# Patient Record
Sex: FEMALE | Employment: UNEMPLOYED | URBAN - NONMETROPOLITAN AREA
[De-identification: names, ages, dates, MRNs, and addresses within clinical notes are randomized per-mention and may not be internally consistent; named-entity substitution may affect disease eponyms.]

---

## 2023-12-28 ENCOUNTER — NURSE ONLY (OUTPATIENT)
Dept: LAB | Age: 33
End: 2023-12-28

## 2023-12-30 LAB
SOURCE: NORMAL
TRICHOMONAS VAGINALI, MOLECULAR: NEGATIVE

## 2024-01-03 LAB
C. TRACHOMATIS DNA,THIN PREP: NEGATIVE
N. GONORRHOEAE DNA, THIN PREP: NEGATIVE
SOURCE: NORMAL

## 2024-01-10 LAB — CYTOLOGY THIN PREP PAP: NORMAL

## 2024-04-10 ENCOUNTER — HOSPITAL ENCOUNTER (OUTPATIENT)
Dept: PHYSICAL THERAPY | Age: 34
Setting detail: THERAPIES SERIES
Discharge: HOME | End: 2024-04-10
Payer: COMMERCIAL

## 2024-04-10 PROCEDURE — 97530 THERAPEUTIC ACTIVITIES: CPT

## 2024-04-10 PROCEDURE — 97162 PT EVAL MOD COMPLEX 30 MIN: CPT

## 2024-04-10 PROCEDURE — 97110 THERAPEUTIC EXERCISES: CPT

## 2024-04-10 NOTE — PROGRESS NOTES
** PLEASE SIGN, DATE AND TIME CERTIFICATION BELOW AND RETURN TO [facility] OUTPATIENT REHABILITATION (FAX #: [phone]).  ATTEST/CO-SIGN IF ACCESSING VIA [employer].  THANK YOU.**    I certify that I have examined the patient below and determined that Physical Medicine and Rehabilitation service is necessary and that I approve the established plan of care for up to 90 days or as specifically noted.  Attestation, signature or co-signature of physician indicates approval of certification requirements.    ________________________ ____________ __________  Physician Signature   Date   Time  [facility]  PHYSICAL THERAPY  OUTPATIENT REHABILITATION - SPECIALIZED THERAPY SERVICES  [x] PELVIC HEALTH EVALUATION  [] DAILY NOTE  [] PROGRESS NOTE [] DISCHARGE NOTE    Date: 4/10/2024  Patient Name:  Elida Rosenthal  : 1990  MRN: [id number]  CSN: [id number]    Referring Practitioner Thao Anand MD   Diagnosis Pelvic and perineal pain   Treatment Diagnosis M62.81 - Muscle Weakness (Generalized) and R10.2 - Pelvic and Perineal Pain  R10.30 - Lower Abdominal Pain, Unspecified  M99.05 - Segmental and Somatic Dysfunction of Pelvic Region   Date of Evaluation 4/10/24    Additional Pertinent History Anxiety, issues with blood clots at last delivery      Functional Outcome Measure Used Modified RUSSEL   Functional Outcome Score 20/50 (4/10/24)       Insurance: Primary: Payor: BCBS /  /  / ,   Secondary:    Authorization Information: No precert required   Visit # 1, 1/10 for progress note (Reporting Period: 4/10/24 to     )   Visits Allowed: 60 visits per calendar year.  UNKNOWN visits have been used.. Hard Max.. PT/OT/ST COMBINED    Recertification Date: 6/10/24   Physician Follow-Up:    Physician Orders:    History of Present Illness: Pt is a 33 year old female who is 27 weeks pregnant and presents with complaints of L SI pain, occasionally R SI pain, and lower abdominal tightness. States that the pain

## 2024-04-18 ENCOUNTER — HOSPITAL ENCOUNTER (OUTPATIENT)
Dept: PHYSICAL THERAPY | Age: 34
Setting detail: THERAPIES SERIES
Discharge: HOME | End: 2024-04-18
Payer: COMMERCIAL

## 2024-04-18 PROCEDURE — 97110 THERAPEUTIC EXERCISES: CPT

## 2024-04-18 PROCEDURE — 97530 THERAPEUTIC ACTIVITIES: CPT

## 2024-04-18 NOTE — PROGRESS NOTES
[facility]  PHYSICAL THERAPY  OUTPATIENT REHABILITATION - SPECIALIZED THERAPY SERVICES  [] PELVIC HEALTH EVALUATION  [x] DAILY NOTE  [] PROGRESS NOTE [] DISCHARGE NOTE    Date: 2024  Patient Name:  Elida oRsenthal  : 1990  MRN: [id number]  CSN: [id number]    Referring Practitioner Thao Anand MD   Diagnosis Pelvic and perineal pain   Treatment Diagnosis M62.81 - Muscle Weakness (Generalized) and R10.2 - Pelvic and Perineal Pain  R10.30 - Lower Abdominal Pain, Unspecified  M99.05 - Segmental and Somatic Dysfunction of Pelvic Region   Date of Evaluation 4/10/24    Additional Pertinent History Anxiety, issues with blood clots at last delivery      Functional Outcome Measure Used Modified RUSSEL   Functional Outcome Score 20/50 (4/10/24)       Insurance: Primary: Payor: BCBS /  /  / ,   Secondary:    Authorization Information: No precert required   Visit # 2, 2/10 for progress note (Reporting Period: 4/10/24 to     )   Visits Allowed: 60 visits per calendar year.  UNKNOWN visits have been used.. Hard Max.. PT/OT/ST COMBINED    Recertification Date: 6/10/24   Physician Follow-Up:    Physician Orders:    History of Present Illness: Pt is a 33 year old female who is 27 weeks pregnant and presents with complaints of L SI pain, occasionally R SI pain, and lower abdominal tightness. States that the pain started once she hit the second trimester. Has been seeing chiropractor that seems like it has been helpful. With prolonged standing she has a dull ache in the pelvic area and it feels like the baby is going to fall out. Would like to strengthen prior to .     SUBJECTIVE: Pt reports that she feels really loose on the L side if she steps the wrong way janelle in the evenings after being active throughout day. Also reports that she feels like her belly gets in the way with attempts at swiss ball rollouts. States that she is making 2 trips to [place] this weekend and wonders if there are

## 2024-04-24 ENCOUNTER — APPOINTMENT (OUTPATIENT)
Dept: PHYSICAL THERAPY | Age: 34
End: 2024-04-24
Payer: COMMERCIAL

## 2024-10-14 ENCOUNTER — OFFICE VISIT (OUTPATIENT)
Dept: FAMILY MEDICINE CLINIC | Age: 34
End: 2024-10-14

## 2024-10-14 VITALS
HEIGHT: 62 IN | WEIGHT: 165.2 LBS | DIASTOLIC BLOOD PRESSURE: 80 MMHG | TEMPERATURE: 98.1 F | BODY MASS INDEX: 30.4 KG/M2 | SYSTOLIC BLOOD PRESSURE: 108 MMHG | HEART RATE: 80 BPM | OXYGEN SATURATION: 98 % | RESPIRATION RATE: 16 BRPM

## 2024-10-14 DIAGNOSIS — R89.9 ABNORMAL LABORATORY TEST: ICD-10-CM

## 2024-10-14 DIAGNOSIS — R16.0 HEPATOMEGALY: ICD-10-CM

## 2024-10-14 DIAGNOSIS — M54.9 BACK PAIN, UNSPECIFIED BACK LOCATION, UNSPECIFIED BACK PAIN LATERALITY, UNSPECIFIED CHRONICITY: ICD-10-CM

## 2024-10-14 DIAGNOSIS — Z23 NEED FOR INFLUENZA VACCINATION: ICD-10-CM

## 2024-10-14 DIAGNOSIS — F32.A DEPRESSION, UNSPECIFIED DEPRESSION TYPE: ICD-10-CM

## 2024-10-14 DIAGNOSIS — F41.9 ANXIETY: ICD-10-CM

## 2024-10-14 DIAGNOSIS — Z00.00 ENCOUNTER FOR MEDICAL EXAMINATION TO ESTABLISH CARE: Primary | ICD-10-CM

## 2024-10-14 LAB
BILIRUBIN, POC: NEGATIVE
BLOOD URINE, POC: NEGATIVE
CLARITY, POC: CLEAR
COLOR, POC: YELLOW
GLUCOSE URINE, POC: NEGATIVE MG/DL
KETONES, POC: NEGATIVE MG/DL
LEUKOCYTE EST, POC: NEGATIVE
NITRITE, POC: NEGATIVE
PH, POC: 6
PROTEIN, POC: NEGATIVE MG/DL
SPECIFIC GRAVITY, POC: 1.02
UROBILINOGEN, POC: 0.2 MG/DL

## 2024-10-14 SDOH — ECONOMIC STABILITY: FOOD INSECURITY: WITHIN THE PAST 12 MONTHS, THE FOOD YOU BOUGHT JUST DIDN'T LAST AND YOU DIDN'T HAVE MONEY TO GET MORE.: NEVER TRUE

## 2024-10-14 SDOH — ECONOMIC STABILITY: FOOD INSECURITY: WITHIN THE PAST 12 MONTHS, YOU WORRIED THAT YOUR FOOD WOULD RUN OUT BEFORE YOU GOT MONEY TO BUY MORE.: NEVER TRUE

## 2024-10-14 SDOH — HEALTH STABILITY: PHYSICAL HEALTH: ON AVERAGE, HOW MANY MINUTES DO YOU ENGAGE IN EXERCISE AT THIS LEVEL?: 40 MIN

## 2024-10-14 SDOH — ECONOMIC STABILITY: INCOME INSECURITY: HOW HARD IS IT FOR YOU TO PAY FOR THE VERY BASICS LIKE FOOD, HOUSING, MEDICAL CARE, AND HEATING?: NOT HARD AT ALL

## 2024-10-14 SDOH — HEALTH STABILITY: PHYSICAL HEALTH: ON AVERAGE, HOW MANY DAYS PER WEEK DO YOU ENGAGE IN MODERATE TO STRENUOUS EXERCISE (LIKE A BRISK WALK)?: 3 DAYS

## 2024-10-14 ASSESSMENT — PATIENT HEALTH QUESTIONNAIRE - PHQ9
5. POOR APPETITE OR OVEREATING: SEVERAL DAYS
2. FEELING DOWN, DEPRESSED OR HOPELESS: SEVERAL DAYS
SUM OF ALL RESPONSES TO PHQ QUESTIONS 1-9: 3
6. FEELING BAD ABOUT YOURSELF - OR THAT YOU ARE A FAILURE OR HAVE LET YOURSELF OR YOUR FAMILY DOWN: NOT AT ALL
1. LITTLE INTEREST OR PLEASURE IN DOING THINGS: NOT AT ALL
SUM OF ALL RESPONSES TO PHQ9 QUESTIONS 1 & 2: 1
SUM OF ALL RESPONSES TO PHQ QUESTIONS 1-9: 3
SUM OF ALL RESPONSES TO PHQ QUESTIONS 1-9: 3
4. FEELING TIRED OR HAVING LITTLE ENERGY: SEVERAL DAYS
1. LITTLE INTEREST OR PLEASURE IN DOING THINGS: NOT AT ALL
7. TROUBLE CONCENTRATING ON THINGS, SUCH AS READING THE NEWSPAPER OR WATCHING TELEVISION: NOT AT ALL
SUM OF ALL RESPONSES TO PHQ QUESTIONS 1-9: 1
9. THOUGHTS THAT YOU WOULD BE BETTER OFF DEAD, OR OF HURTING YOURSELF: NOT AT ALL
SUM OF ALL RESPONSES TO PHQ QUESTIONS 1-9: 1
10. IF YOU CHECKED OFF ANY PROBLEMS, HOW DIFFICULT HAVE THESE PROBLEMS MADE IT FOR YOU TO DO YOUR WORK, TAKE CARE OF THINGS AT HOME, OR GET ALONG WITH OTHER PEOPLE: NOT DIFFICULT AT ALL
SUM OF ALL RESPONSES TO PHQ QUESTIONS 1-9: 1
2. FEELING DOWN, DEPRESSED OR HOPELESS: SEVERAL DAYS
SUM OF ALL RESPONSES TO PHQ QUESTIONS 1-9: 1
8. MOVING OR SPEAKING SO SLOWLY THAT OTHER PEOPLE COULD HAVE NOTICED. OR THE OPPOSITE, BEING SO FIGETY OR RESTLESS THAT YOU HAVE BEEN MOVING AROUND A LOT MORE THAN USUAL: NOT AT ALL
3. TROUBLE FALLING OR STAYING ASLEEP: NOT AT ALL
SUM OF ALL RESPONSES TO PHQ QUESTIONS 1-9: 3
SUM OF ALL RESPONSES TO PHQ9 QUESTIONS 1 & 2: 1

## 2024-10-14 ASSESSMENT — ENCOUNTER SYMPTOMS
VOMITING: 0
SINUS PRESSURE: 0
SHORTNESS OF BREATH: 0
CONSTIPATION: 0
COLOR CHANGE: 0
VOICE CHANGE: 0
CHOKING: 0
NAUSEA: 0
CHEST TIGHTNESS: 0
EYE PAIN: 0
WHEEZING: 0
EYE DISCHARGE: 0
ABDOMINAL PAIN: 0
ABDOMINAL DISTENTION: 0
EYE ITCHING: 0
COUGH: 0

## 2024-10-14 NOTE — PROGRESS NOTES
After obtaining consent, and per orders of Nathaly CORONADO, injection of Influenza given in Left deltoid by Lul Oleary LPN. Patient instructed to remain in clinic for 20 minutes afterwards, and to report any adverse reaction to me immediately.    Immunizations Administered       Name Date Dose Route    Influenza, FLUCELVAX, (age 6 mo+) IM, Trivalent PF, 0.5mL 10/14/2024 0.5 mL Intramuscular    Site: Deltoid- Left    Lot: [id number]    NDC: 74254-330-16        Pt tolerated injection well. Checklist filled out. VIS given

## 2024-10-14 NOTE — PROGRESS NOTES
Health Maintenance Due   Topic Date Due    Varicella vaccine (1 of 2 - 13+ 2-dose series) Never done    HIV screen  Never done    Hepatitis C screen  Never done    Flu vaccine (1) 08/01/2024     Wanting flu shot today

## 2024-10-14 NOTE — PROGRESS NOTES
[facility]  [address] [address]  Dept: [phone]  Loc: [phone]    Elida Rosenthal (:  1990) is a 34 y.o. female,Established patient, here for evaluation of the following chief complaint(s):  New Patient (Wanting to est care and post appendectomy 10/6/24 in [place]. Concern of fatty liver/ enlarged on CT. Over due for lupus anticoagulant testing, positive around 2 years ago and they wanted it tested again around 6 months. Having lower back pain since surgery)      ASSESSMENT/PLAN:  1. Encounter for medical examination to establish care  New patient establishing care.   She was in [place] on vacation and went to the ER on 10/6, ended up having a laparoscopic appendectomy on 10/6.     Incidental finding : Concern of fatty liver/ enlarged on CT.   Reports she is Over due for lupus anticoagulant testing, popped positive around 2 years ago and they wanted it tested again around 6 months.   Having lower back pain since surgery  Father with alcohol related cirrhosis.   2. Hepatomegaly  Incidental finding : Concern of fatty liver/ enlarged on CT.   Reports she is Over due for lupus anticoagulant testing, popped positive around 2 years ago and they wanted it tested again around 6 months.   Having lower back pain since surgery  Father with alcohol related cirrhosis.   Referral to GI placed.     -     TONY - Elisabeth Lehman MD, Gastroenterology, [place]  3. Abnormal laboratory test  -     Lupus Anticoagulant Reflex Panel; Future  4. Anxiety  5. Depression, unspecified depression type  Controlled with sertraline 50 mg. Patient denies any SI/HI  6. Back pain, unspecified back location, unspecified back pain laterality, unspecified chronicity  UA unremarkable.   No abnormality noted on exam.   Abdomen soft, non tender.   Likely d/t surgery and travel.  -     POCT Urinalysis No Micro (Auto)  7. Need for influenza vaccination  -     Influenza,

## 2024-10-16 LAB
CONFIRM DRVVT STA-STACLOT: 9.1 S
DRVVT SCREEN TO CONFIRM RATIO: ABNORMAL {RATIO}
DRVVT/DRVVT CFM P DOAC NEUT NORM PPP-RTO: ABNORMAL {RATIO}
HEPARIN NT PPP QL: ABNORMAL
LA 3 SCREEN W REFLEX-IMP: ABNORMAL
LMW HEPARIN IND PLT AB SER QL: ABNORMAL
MIXING DRVVT/NORMAL: ABNORMAL %
PROTHROMBIN TIME: 12.7 S (ref 12–15.5)
SCREEN APTT/NORMAL: 1.25
SCREEN APTT/NORMAL: ABNORMAL
SCREEN DRVVT/NORMAL: 1.17 %
THROMBIN TIME: 15.7 S

## 2024-10-18 ASSESSMENT — ENCOUNTER SYMPTOMS: BACK PAIN: 1

## 2024-10-24 ENCOUNTER — HOSPITAL ENCOUNTER (OUTPATIENT)
Age: 34
Discharge: HOME | End: 2024-10-24
Payer: COMMERCIAL

## 2024-10-24 DIAGNOSIS — R89.9 ABNORMAL LABORATORY TEST: ICD-10-CM

## 2024-10-24 LAB — RHEUMATOID FACT SERPL-ACNC: 12 IU/ML (ref 0–13)

## 2024-10-24 PROCEDURE — 86038 ANTINUCLEAR ANTIBODIES: CPT

## 2024-10-24 PROCEDURE — 86430 RHEUMATOID FACTOR TEST QUAL: CPT

## 2024-10-24 PROCEDURE — 36415 COLL VENOUS BLD VENIPUNCTURE: CPT

## 2024-10-26 LAB — NUCLEAR IGG SER QL IA: NORMAL

## 2024-10-28 ENCOUNTER — TELEPHONE (OUTPATIENT)
Dept: FAMILY MEDICINE CLINIC | Age: 34
End: 2024-10-28

## 2024-10-28 NOTE — TELEPHONE ENCOUNTER
----- Message from JIM Christensen CNP sent at 10/28/2024  4:54 PM EDT -----  KELSI unremarkable along as RH Factor, but with her abnormal Lupus Anticoagulant panel, I still feel a referral to Rheumatology is appropriate.

## 2024-11-01 LAB
BASOPHILS ABSOLUTE: 0.1 /ΜL
BASOPHILS RELATIVE PERCENT: 1.2 %
EOSINOPHILS ABSOLUTE: 0.2 /ΜL
EOSINOPHILS RELATIVE PERCENT: 2.7 %
HCT VFR BLD CALC: 40.2 % (ref 36–46)
HEMOGLOBIN: 14.2 G/DL (ref 12–16)
LYMPHOCYTES ABSOLUTE: 1.9 /ΜL
LYMPHOCYTES RELATIVE PERCENT: 30.1 %
MCH RBC QN AUTO: 30.8 PG
MCHC RBC AUTO-ENTMCNC: 35.2 G/DL
MCV RBC AUTO: 87.4 FL
MONOCYTES ABSOLUTE: 0.4 /ΜL
MONOCYTES RELATIVE PERCENT: 6.7 %
NEUTROPHILS ABSOLUTE: 3.8 /ΜL
NEUTROPHILS RELATIVE PERCENT: 59.3 %
PLATELET # BLD: 324 K/ΜL
PMV BLD AUTO: 9.2 FL
RBC # BLD: 4.6 10^6/ΜL
WBC # BLD: 6.3 10^3/ML

## 2025-01-14 ENCOUNTER — PATIENT MESSAGE (OUTPATIENT)
Dept: FAMILY MEDICINE CLINIC | Age: 35
End: 2025-01-14

## 2025-01-14 NOTE — TELEPHONE ENCOUNTER
We do not typically give this for the stomach virus.   I would encouraged ginger ale or other carbonated beverages that can help. If she is vomiting and unable to keep fluids down, then it may be appropriate, but yes, she would need to be seen for that.

## 2025-01-29 ENCOUNTER — TRANSCRIBE ORDERS (OUTPATIENT)
Dept: ADMINISTRATIVE | Age: 35
End: 2025-01-29

## 2025-01-29 DIAGNOSIS — R16.0 HEPATOMEGALY: Primary | ICD-10-CM

## 2025-02-04 ENCOUNTER — HOSPITAL ENCOUNTER (OUTPATIENT)
Dept: ULTRASOUND IMAGING | Age: 35
Discharge: HOME | End: 2025-02-04
Payer: COMMERCIAL

## 2025-02-04 DIAGNOSIS — R16.0 HEPATOMEGALY: ICD-10-CM

## 2025-02-04 PROCEDURE — 93975 VASCULAR STUDY: CPT

## 2025-02-04 PROCEDURE — 76705 ECHO EXAM OF ABDOMEN: CPT

## 2025-04-17 NOTE — PROGRESS NOTES
[facility] RHEUMATOLOGY CONSULT   Date Of Service: 4/24/2025  Provider: JOVANNI HOWELL DO, DO  Name: Elida Rosenthal   MRN: [id number]          Subjective     CC: New Patient (Abnormal labs)       Elida Rosenthal   is a(n)34 y.o. female with a hx of  has a past medical history of Anxiety, Depression, Hypertension, and Irritable bowel syndrome.  , MTHFR , h/o low back pain  referred by Nathaly Cole, APR* for evaluation of h/o lupus anticoagulant +  (+ testing 12/16/2022, 10/14/2024)     Pt found to have + testing 12/16/2022, 10/14/2024. Initially evaluated after 3rd pregnancy - clotting in the placenta and umbilical cord, - pre-eclampsia w/ first pregnancy. No miscarriages , DVT, PE, arterial thrombosis.     Reports hx of IBS - D diagnosed in 20's - really improved. Report hives, rashes, myalgia, arthralgia intermittently occurring in her last 20's. - increased pain when having a regular menstrual cycles and 2 weeks after the cycles. She is currently having pain in the lower back and left knee - constant nagging pain in the left knee and lower back. Most severe at the end of the day. Pain up to 5/10. Aggravating - , running, activities.  Alleviating: hot tub, prn ibuprofen.     + am stiffness lasting for the first hour, relief with movement. Waking b/c children and occasionally the pain. + gelling (car rides) , denies joint swelling.   + dry mouth - intermittent   + occasional numbness in the legs - typically with activity - relief w/ movement.     1 child w/ eczema,            -denies Photosenstivity, Rash,, oral/nasal sores, Raynaud's, digital ulcerations, skin tightening, renal disease,foamy urination, hematuria, sz's, blood clots, serositis, Enthesitis, dactylitis, nail changes, hx of STD,  personal or family history of Psoriatic arthritis, psoriasis, ank spond,       Cancer screening: up to date.       Review of Systems    Review of Systems   Constitutional:  Positive for fatigue.   HENT: Negative.

## 2025-04-24 ENCOUNTER — OFFICE VISIT (OUTPATIENT)
Age: 35
End: 2025-04-24
Payer: COMMERCIAL

## 2025-04-24 ENCOUNTER — LAB (OUTPATIENT)
Dept: LAB | Age: 35
End: 2025-04-24

## 2025-04-24 ENCOUNTER — RESULTS FOLLOW-UP (OUTPATIENT)
Age: 35
End: 2025-04-24

## 2025-04-24 VITALS
DIASTOLIC BLOOD PRESSURE: 72 MMHG | HEART RATE: 75 BPM | OXYGEN SATURATION: 99 % | SYSTOLIC BLOOD PRESSURE: 106 MMHG | HEIGHT: 62 IN | WEIGHT: 149 LBS | BODY MASS INDEX: 27.42 KG/M2

## 2025-04-24 DIAGNOSIS — M79.10 MYALGIA: Primary | ICD-10-CM

## 2025-04-24 DIAGNOSIS — M25.50 POLYARTHRALGIA: ICD-10-CM

## 2025-04-24 DIAGNOSIS — R76.0 LUPUS ANTICOAGULANT POSITIVE: ICD-10-CM

## 2025-04-24 DIAGNOSIS — M79.10 MYALGIA: ICD-10-CM

## 2025-04-24 DIAGNOSIS — R53.83 OTHER FATIGUE: ICD-10-CM

## 2025-04-24 LAB
ALBUMIN SERPL BCG-MCNC: 4.4 G/DL (ref 3.4–4.9)
ALP SERPL-CCNC: 97 U/L (ref 38–126)
ALT SERPL W/O P-5'-P-CCNC: 13 U/L (ref 10–35)
ANION GAP SERPL CALC-SCNC: 12 MEQ/L (ref 8–16)
AST SERPL-CCNC: 23 U/L (ref 10–35)
BASOPHILS ABSOLUTE: 0.1 THOU/MM3 (ref 0–0.1)
BASOPHILS NFR BLD AUTO: 1.2 %
BILIRUB SERPL-MCNC: 0.3 MG/DL (ref 0.3–1.2)
BUN SERPL-MCNC: 14 MG/DL (ref 8–23)
CALCIUM SERPL-MCNC: 9.3 MG/DL (ref 8.6–10)
CHLORIDE SERPL-SCNC: 106 MEQ/L (ref 98–111)
CO2 SERPL-SCNC: 25 MEQ/L (ref 22–29)
CREAT SERPL-MCNC: 0.7 MG/DL (ref 0.5–0.9)
CRP SERPL-MCNC: 0.34 MG/DL (ref 0–0.5)
DEPRECATED RDW RBC AUTO: 43.4 FL (ref 35–45)
EOSINOPHIL NFR BLD AUTO: 2.5 %
EOSINOPHILS ABSOLUTE: 0.1 THOU/MM3 (ref 0–0.4)
ERYTHROCYTE [DISTWIDTH] IN BLOOD BY AUTOMATED COUNT: 12.9 % (ref 11.5–14.5)
ERYTHROCYTE [SEDIMENTATION RATE] IN BLOOD BY WESTERGREN METHOD: 16 MM/HR (ref 0–20)
GFR SERPL CREATININE-BSD FRML MDRD: > 90 ML/MIN/1.73M2
GLUCOSE SERPL-MCNC: 91 MG/DL (ref 74–109)
HCT VFR BLD AUTO: 43.5 % (ref 37–47)
HGB BLD-MCNC: 14.5 GM/DL (ref 12–16)
IMM GRANULOCYTES # BLD AUTO: 0.01 THOU/MM3 (ref 0–0.07)
IMM GRANULOCYTES NFR BLD AUTO: 0.2 %
LYMPHOCYTES ABSOLUTE: 1.8 THOU/MM3 (ref 1–4.8)
LYMPHOCYTES NFR BLD AUTO: 31.9 %
MCH RBC QN AUTO: 30.1 PG (ref 26–33)
MCHC RBC AUTO-ENTMCNC: 33.3 GM/DL (ref 32.2–35.5)
MCV RBC AUTO: 90.4 FL (ref 81–99)
MONOCYTES ABSOLUTE: 0.3 THOU/MM3 (ref 0.4–1.3)
MONOCYTES NFR BLD AUTO: 6 %
NEUTROPHILS ABSOLUTE: 3.3 THOU/MM3 (ref 1.8–7.7)
NEUTROPHILS NFR BLD AUTO: 58.2 %
NRBC BLD AUTO-RTO: 0 /100 WBC
PLATELET # BLD AUTO: 329 THOU/MM3 (ref 130–400)
PMV BLD AUTO: 11.2 FL (ref 9.4–12.4)
POTASSIUM SERPL-SCNC: 4.2 MEQ/L (ref 3.5–5.2)
PROT SERPL-MCNC: 7.1 G/DL (ref 6.4–8.3)
RBC # BLD AUTO: 4.81 MILL/MM3 (ref 4.2–5.4)
SODIUM SERPL-SCNC: 143 MEQ/L (ref 135–145)
TSH SERPL DL<=0.05 MIU/L-ACNC: 1.27 UIU/ML (ref 0.27–4.2)
WBC # BLD AUTO: 5.6 THOU/MM3 (ref 4.8–10.8)

## 2025-04-24 PROCEDURE — 99204 OFFICE O/P NEW MOD 45 MIN: CPT | Performed by: INTERNAL MEDICINE

## 2025-04-24 ASSESSMENT — RHEUMATOLOGY NEW PATIENT QUESTIONNAIRE
NODULES/BUMPS: N
EXCESSIVE HAIR LOSS (MORE THAN YOUR NORM): N
VAGINAL DRYNESS: N
SWOLLEN OR TENDER GLANDS: N
DEPRESSION: N
COLOR CHANGES OF HANDS OR FEET IN THE COLD: Y
SWOLLEN LEGS OR FEET: N
EYE PAIN: N
UNUSUAL BLEEDING: N
SORES IN MOUTH OR NOSE: N
STOMACH PAIN: N
JOINT PAIN: Y
HOARSE VOICE: N
UNEXPLAINED HEARING LOSS: N
HEARTBURN OR REFLUX: N
SKIN TIGHTNESS: N
PAIN OR BURNING ON URINATION: N
PERSISTENT DIARRHEA: N
DOUBLE OR BLURRED VISION: N
EASILY LOSING TEMPER: N
DIFFICULTY SWALLOWING: N
FAINTING: N
BEHAVIORAL CHANGES: N
CHEST PAIN: N
INCREASED SUSCEPTIBILITY TO INFECTION: N
HEADACHES: Y
DIFFICULTY STAYING ASLEEP: N
EASY BRUISING: N
UNUSUALLY RAPID OR SLOWED HEART RATE: N
JOINT SWELLING: N
MORNING STIFFNESS: Y
LOSS OF VISION: N
NAUSEA: N
LOSS OF CONSCIOUSNESS: N
COUGH: N
AGITATION: N
NUMBNESS OR TINGLING IN HANDS OR FEET: Y
SUN SENSITIVE (SUN ALLERGY): N
ANXIETY: N
RASH: Y
RASH OR ULCERS: N
NIGHT SWEATS: N
BLOOD IN STOOLS: N
EYE REDNESS: N
SHORTNESS OF BREATH: N
BLACK STOOLS: N
UNEXPLAINED WEIGHT CHANGE: N
HOW WOULD YOU DESCRIBE YOUR STIFFNESS ON AVERAGE: MILD
MORNING STIFFNESS IN LOWER BACK: Y
SEIZURES: N
DIFFICULTY FALLING ASLEEP: N
ABNORMAL URINE: N
UNUSUAL FATIGUE: N
MUSCLE WEAKNESS: N
EYE DRYNESS: N
SKIN REDNESS: Y
ANEMIA: N
DRYNESS OF MOUTH: N
FEVER: N
MEMORY LOSS: N
JAUNDICE: N
VOMITING OF BLOOD OR COFFEE GROUND CONSISTENCY MATERIAL: N
DIFFICULTY BREATHING LYING DOWN: N

## 2025-04-24 ASSESSMENT — ENCOUNTER SYMPTOMS
GASTROINTESTINAL NEGATIVE: 1
RESPIRATORY NEGATIVE: 1
EYES NEGATIVE: 1

## 2025-04-26 LAB
B2 GLYCOPROT1 IGG SERPL IA-ACNC: < 10 SGU
B2 GLYCOPROT1 IGM SERPL IA-ACNC: < 10 SMU
ENA SS-A 60KD AB SER-ACNC: NORMAL
ENA SS-A IGG SER QL: NORMAL